# Patient Record
(demographics unavailable — no encounter records)

---

## 2024-10-09 NOTE — REVIEW OF SYSTEMS
[Fatigue] : fatigue [Polyuria] : polyuria [Nocturia] : nocturia [Joint Pain] : joint pain [All other systems negative] : All other systems negative [Recent Weight Gain (___ Lbs)] : no recent weight gain [Recent Weight Loss (___ Lbs)] : no recent weight loss [Visual Field Defect] : no visual field defect [Dysphagia] : no dysphagia [Dysphonia] : no dysphonia [Chest Pain] : no chest pain [Palpitations] : no palpitations [Shortness Of Breath] : no shortness of breath [Nausea] : no nausea [Constipation] : no constipation [Vomiting] : no vomiting [Diarrhea] : no diarrhea [Depression] : no depression [Polydipsia] : no polydipsia

## 2024-10-09 NOTE — HISTORY OF PRESENT ILLNESS
[FreeTextEntry1] : 62 y/o M w/ hx of Type 2 DM since 2010 complicated by CVA and nephropathy with microalbuminuria. Seen initially at [facility] 11/2019 for CVA r/o. A1c at that time 9.2% and was taking Lantus 8-18 units depending on bedtime glucose, metformin 1000mg BID, and glimepiride 4mg BID with adherence. Managed in-patient on basal bolus and discharged on Lantus 22 units qhs and Humalog 7 units + correction. Seen initially by me 10/2020, recommended decrease Lantus to 20 units qhs and Humalog 9-11 units with meals + correction. Around 8/2021 had only been taking Humalog 10/14/15 +correction as basal insulin doses had been causing overnight hypoglycemia. Due to this he had not been taking long acting insulin. Recommended to remain off basal and continue with Humalog 12/14/15. Seen 12/2021 recommended low dose basal at 10 units qhs and Humalog 14/10/14. In 2022 he stopped taking insulin as he self-discontinued. Was taking metformin 1000 mg BID and Jardiance 25 mg daily with A1c of 7.8% Recommended continue to monitor and if A1c remains > 7.5% would add additional agent. Actos 15mg daily added 9/2023. Increased to 45 mg daily. Since then, glucose values tend to be around 100-200. Checking with [employer] [facility]. No reported hypoglycemia or symptoms of hypoglycemia. Had COVID 1/2023. + polyuria and nocturia on SGLT2. No polydipsia, nausea or vomiting. Eliminated soda and juice. Recommended avoiding sugar in coffee now using Stevia. Has been nonadherent to diet especially with late night eating and snacking. Father with hx of Type 2 DM. +hx of pancreatitis possibly from Janumet. +weight loss.  Last optho 12/20/2023 without evidence of retinopathy.   Also hx of HTN on losartan and diltiazem  HLD on statin

## 2024-10-09 NOTE — PHYSICAL EXAM
[Alert] : alert [Well Nourished] : well nourished [Healthy Appearance] : healthy appearance [No Acute Distress] : no acute distress [Well Developed] : well developed [No Proptosis] : no proptosis [Supple] : the neck was supple [Thyroid Not Enlarged] : the thyroid was not enlarged [No Respiratory Distress] : no respiratory distress [No Accessory Muscle Use] : no accessory muscle use [Normal Rate and Effort] : normal respiratory rate and effort [Clear to Auscultation] : lungs were clear to auscultation bilaterally [Normal S1, S2] : normal S1 and S2 [Normal Rate] : heart rate was normal [Regular Rhythm] : with a regular rhythm [No Edema] : no peripheral edema [Normal Bowel Sounds] : normal bowel sounds [Not Tender] : non-tender [Not Distended] : not distended [Soft] : abdomen soft [No Stigmata of Cushings Syndrome] : no stigmata of Cushings Syndrome [No Involuntary Movements] : no involuntary movements were seen [Right foot was examined, including] : right foot ~C was examined, including visual inspection with sensory and pulse exams [Left foot was examined, including] : left foot ~C was examined, including visual inspection with sensory and pulse exams [Normal] : normal [2+] : 2+ in the dorsalis pedis [No Motor Deficits] : the motor exam was normal [Oriented x3] : oriented to person, place, and time [Normal Affect] : the affect was normal [Normal Mood] : the mood was normal [Diminished Throughout Both Feet] : normal tactile sensation with monofilament testing throughout both feet

## 2024-10-09 NOTE — DATA REVIEWED
[FreeTextEntry1] : Labs 10/9/2024: A1c 8.9%  Rob reviewed and discussed with patient at visit 7/3/2024: Target 57%; High 33%; Very High 10%; Low 0%. Glucose consistently above goal fasting with nonadherence to diet at night with stable high values throughout the day.  Labs 7/3/2024: A1c 9.1% TSH 1.16 Free T4 1.2 Alb/Cr neg. CMP normal except glucose of 262 and Cr 1.33 TG 68 Chol 163 HDL 76 LDL 73 CBC normal  Rob reviewed and discussed with patient at visit 3/27/2024: Target 51%; High 40%; Very High 9%; Low 0%. Glucose consistently above goal with nonadherence to diet.  Rob reviewed and discussed with patient at visit 1/10/2024: Target 32%; High 49%; Very High 19%; Low 0%. Glucose consistently above goal with nonadherence to diet recently.   Labs 1/10/2024: A1c 9.3%  Rob reviewed and discussed with patient at visit 9/20/2023: Target 58%; High 30%; Very High 12%; Low 0%. Glucose variable with hyperglycemia related to breakfast and dinner food choices especially when eating rice. Breakfast more variable.   Labs 9/20/2023: A1c 8.3% CBC normal TSH 1.34 Free T4 1.2 CMP normal except glucose of 148 Alb/Cr 48 LDL 85 HDL 75 Chol 178   Rob reviewed and discussed with patient at visit 5/1/2023: Target 65%; High 28%; Very High 7%; Low 0%. Glucose variable with hyperglycemia related to breakfast and dinner food choices. Breakfast more variable.   Labs 5/1/2023: A1c 8%  Rob reviewed and discussed with patient at visit 1/30/2023: Target 76%; High 22%; Very High 2%; Low 0%. Glucose variable with hyperglycemia related to breakfast and dinner food choices. Breakfast more variable.   Labs 1/30/2023: A1c 7.8%  Rob reviewed and discussed with patient at visit 10/31/2022: Target 69%; High 29%; Very High 2%; Low 0%. Glucose variable with hyperglycemia related to breakfast and dinner food choices. Breakfast more variable.   Labs 10/31/2022: A1c 9%  Rob reviewed and discussed with patient at visit 7/27/2022: Target 63%; High 31%; Very High 6%; Low 0%. Glucose variable with hyperglycemia related to breakfast and dinner food choices. Breakfast more variable. Hypoglycemia fasting when taking more than 15 units of Lantus. Also in the evening if prolonged fasting between lunch and dinner.  Also with some overcorrection of hyperglycemia leading to hypoglycemia.   Labs 7/27/2022: A1c 7.6%  Rob reviewed and discussed with patient at visit 12/7/2021: Target 61%; High 31%; Very High 5%; Low 3%. Glucose variable with hyperglycemia related to breakfast and dinner food choices. Breakfast more variable. Hypoglycemia fasting when taking more than 15 units of Lantus. Also in the evening if prolonged fasting between lunch and dinner.  Also with some overcorrection of hyperglycemia leading to hypoglycemia.   Labs 12/7/2021: A1c 8% CBC normal LDL 97 Lipid Profile at goal Cr 1.51 Glucose 158 CMP otherwise normal Vit D 15.2 TSH 1.19 Free T4 1.2 Micro/Cr 56   Rob reviewed and discussed with patient at visit 8/17/2021: Target 69%; High 24%; Very High 5%; Low 2%. Glucose variable with hyperglycemia related to breakfast and dinner food choices. Breakfast more variable. No hyperglycemia after lunch. Occasional hypoglycemia fasting with large drop overnight. even sometimes without basal insulin on board. Also with some overcorrection of hyperglycemia leading to hypoglycemia.   Labs 8/17/2021: A1c 8.1%  Rob reviewed and discussed with patient at visit 5/4/2021: Target 70%; High 22%; Very High 6%; Low 2%. Glucose variable with hyperglycemia related to breakfast and dinner foot choices. Glucose > 300 after eating yucca based soup. Hyperglycemia consistently after dinner. Breakfast more variable. No hyperglycemia after lunch. Occasional hypoglycemia fasting with large drop overnight.   Labs 5/4/2021: A1c 8.6%  Rob reviewed and discussed with patient at visit 1/26/2021: Glucose variable with hyperglycemia related to breakfast sugar in coffee at times and also missing doses of mealtime insulin. Occasional hypoglycemia fasting.  Labs 1/26/2021: A1c 8.7%  Labs 11/2019: A1c 9.2%

## 2024-10-09 NOTE — ASSESSMENT
[FreeTextEntry1] : 60 y/o M w/  1. Uncontrolled Type 2 DM w/ hyperglycemia on insulin- Discussed the importance of improved glycemic control to prevent long term complications as patient already at risk with hx of CVA. Recommended stricter dietary modifications especially avoiding sugar in coffee. Home use bill not available today. New sensor placed and will follow-up readings to determine where to make further adjustments. Suspect he may need the addition of some basal insulin. Now on Jardiance 25mg daily for additional CV, renal, weight loss, BP benefits. Also metformin 1000 mg BID. Recommended adding additional agent for diabetes. Started on Actos 15mg daily 9/2023. Increased to 45mg daily. Pt. without hx of CAD, CHF, or fluid retention. Can reassess in 3 months. Pt. motivated to make changes to late night eating to avoid needing insulin. Retinopathy screening with optho recommended.  Avoiding GLP-1 despite weight loss benefits for him due to hx of pancreatitis while on Janumet.  2. HTN- BP above goal today on diltiazem and cozar. Will adjust regimen if persistently above goal can increase dose of Cozar.  3. HLD- c/w statin.

## 2025-01-14 NOTE — DATA REVIEWED
[FreeTextEntry1] : Rob reviewed and discussed with patient at visit 1/14/2025: Target 70%; High 25%; Very High 5%; Low 0%. Glucose consistently above goal after breakfast or coffee with occasional nonadherence to diet at night with stable high values throughout the day.  Labs 1/14/2025: A1c 7.8%  Labs 10/9/2024: A1c 8.9%  Rob reviewed and discussed with patient at visit 7/3/2024: Target 57%; High 33%; Very High 10%; Low 0%. Glucose consistently above goal fasting with nonadherence to diet at night with stable high values throughout the day.  Labs 7/3/2024: A1c 9.1% TSH 1.16 Free T4 1.2 Alb/Cr neg. CMP normal except glucose of 262 and Cr 1.33 TG 68 Chol 163 HDL 76 LDL 73 CBC normal  Rob reviewed and discussed with patient at visit 3/27/2024: Target 51%; High 40%; Very High 9%; Low 0%. Glucose consistently above goal with nonadherence to diet.  Rob reviewed and discussed with patient at visit 1/10/2024: Target 32%; High 49%; Very High 19%; Low 0%. Glucose consistently above goal with nonadherence to diet recently.   Labs 1/10/2024: A1c 9.3%  Rob reviewed and discussed with patient at visit 9/20/2023: Target 58%; High 30%; Very High 12%; Low 0%. Glucose variable with hyperglycemia related to breakfast and dinner food choices especially when eating rice. Breakfast more variable.   Labs 9/20/2023: A1c 8.3% CBC normal TSH 1.34 Free T4 1.2 CMP normal except glucose of 148 Alb/Cr 48 LDL 85 HDL 75 Chol 178   Rob reviewed and discussed with patient at visit 5/1/2023: Target 65%; High 28%; Very High 7%; Low 0%. Glucose variable with hyperglycemia related to breakfast and dinner food choices. Breakfast more variable.   Labs 5/1/2023: A1c 8%  Rob reviewed and discussed with patient at visit 1/30/2023: Target 76%; High 22%; Very High 2%; Low 0%. Glucose variable with hyperglycemia related to breakfast and dinner food choices. Breakfast more variable.   Labs 1/30/2023: A1c 7.8%  Rob reviewed and discussed with patient at visit 10/31/2022: Target 69%; High 29%; Very High 2%; Low 0%. Glucose variable with hyperglycemia related to breakfast and dinner food choices. Breakfast more variable.   Labs 10/31/2022: A1c 9%  Rob reviewed and discussed with patient at visit 7/27/2022: Target 63%; High 31%; Very High 6%; Low 0%. Glucose variable with hyperglycemia related to breakfast and dinner food choices. Breakfast more variable. Hypoglycemia fasting when taking more than 15 units of Lantus. Also in the evening if prolonged fasting between lunch and dinner.  Also with some overcorrection of hyperglycemia leading to hypoglycemia.   Labs 7/27/2022: A1c 7.6%  Rob reviewed and discussed with patient at visit 12/7/2021: Target 61%; High 31%; Very High 5%; Low 3%. Glucose variable with hyperglycemia related to breakfast and dinner food choices. Breakfast more variable. Hypoglycemia fasting when taking more than 15 units of Lantus. Also in the evening if prolonged fasting between lunch and dinner.  Also with some overcorrection of hyperglycemia leading to hypoglycemia.   Labs 12/7/2021: A1c 8% CBC normal LDL 97 Lipid Profile at goal Cr 1.51 Glucose 158 CMP otherwise normal Vit D 15.2 TSH 1.19 Free T4 1.2 Micro/Cr 56   Rob reviewed and discussed with patient at visit 8/17/2021: Target 69%; High 24%; Very High 5%; Low 2%. Glucose variable with hyperglycemia related to breakfast and dinner food choices. Breakfast more variable. No hyperglycemia after lunch. Occasional hypoglycemia fasting with large drop overnight. even sometimes without basal insulin on board. Also with some overcorrection of hyperglycemia leading to hypoglycemia.   Labs 8/17/2021: A1c 8.1%  Rob reviewed and discussed with patient at visit 5/4/2021: Target 70%; High 22%; Very High 6%; Low 2%. Glucose variable with hyperglycemia related to breakfast and dinner foot choices. Glucose > 300 after eating yucca based soup. Hyperglycemia consistently after dinner. Breakfast more variable. No hyperglycemia after lunch. Occasional hypoglycemia fasting with large drop overnight.   Labs 5/4/2021: A1c 8.6%  Rob reviewed and discussed with patient at visit 1/26/2021: Glucose variable with hyperglycemia related to breakfast sugar in coffee at times and also missing doses of mealtime insulin. Occasional hypoglycemia fasting.  Labs 1/26/2021: A1c 8.7%  Labs 11/2019: A1c 9.2%

## 2025-01-14 NOTE — HISTORY OF PRESENT ILLNESS
[FreeTextEntry1] : 62 y/o M w/ hx of Type 2 DM since 2010 complicated by CVA and nephropathy with microalbuminuria. Seen initially at [facility] 11/2019 for CVA r/o. A1c at that time 9.2% and was taking Lantus 8-18 units depending on bedtime glucose, metformin 1000mg BID, and glimepiride 4mg BID with adherence. Managed in-patient on basal bolus and discharged on Lantus 22 units qhs and Humalog 7 units + correction. Seen initially by me 10/2020, recommended decrease Lantus to 20 units qhs and Humalog 9-11 units with meals + correction. Around 8/2021 had only been taking Humalog 10/14/15 +correction as basal insulin doses had been causing overnight hypoglycemia. Due to this he had not been taking long acting insulin. Recommended to remain off basal and continue with Humalog 12/14/15. Seen 12/2021 recommended low dose basal at 10 units qhs and Humalog 14/10/14. In 2022 he stopped taking insulin as he self-discontinued. Was taking metformin 1000 mg BID and Jardiance 25 mg daily with A1c of 7.8% Recommended continue to monitor and if A1c remains > 7.5% would add additional agent. Actos 15mg daily added 9/2023. Increased to 45 mg daily. Prandin added with lunch a dinner 1mg 10/2024. Since then, glucose values tend to be around 100-200. Checking with [employer] Rob. No reported hypoglycemia or symptoms of hypoglycemia. Had COVID 1/2023. + polyuria and nocturia on SGLT2. No polydipsia, nausea or vomiting. Eliminated soda and juice. Recommended avoiding sugar in coffee now using Stevia. Has been nonadherent to diet especially with late night eating and snacking. Father with hx of Type 2 DM. +hx of pancreatitis possibly from Janumet. +weight loss.  Last optho 12/2024 without evidence of retinopathy.   Also hx of HTN on losartan and diltiazem  HLD on statin

## 2025-01-14 NOTE — ASSESSMENT
[FreeTextEntry1] : 62 y/o M w/  1. Uncontrolled Type 2 DM w/ hyperglycemia on insulin- Discussed the importance of improved glycemic control to prevent long term complications as patient already at risk with hx of CVA. Recommended stricter dietary modifications especially avoiding sugar in coffee. Home use bill not available today. New sensor placed and will follow-up readings to determine where to make further adjustments. Suspect he may need the addition of some basal insulin. Now on Jardiance 25mg daily for additional CV, renal, weight loss, BP benefits. Also metformin 1000 mg BID. Recommended adding additional agent for diabetes. Started on Actos 15mg daily 9/2023. Increased to 45mg daily. Increase prandin to 1mg with all meals. Pt. without hx of CAD, CHF, or fluid retention. Can reassess in 3 months. Pt. motivated to make changes to late night eating to avoid needing insulin. Retinopathy screening with optho recommended.  Avoiding GLP-1 despite weight loss benefits for him due to hx of pancreatitis while on Janumet.  2. HTN- BP above goal today on diltiazem and cozar. Will adjust regimen if persistently above goal can increase dose of Cozar.  3. HLD- c/w statin.